# Patient Record
Sex: FEMALE | Race: WHITE
[De-identification: names, ages, dates, MRNs, and addresses within clinical notes are randomized per-mention and may not be internally consistent; named-entity substitution may affect disease eponyms.]

---

## 2021-02-25 PROBLEM — Z00.00 ENCOUNTER FOR PREVENTIVE HEALTH EXAMINATION: Status: ACTIVE | Noted: 2021-02-25

## 2021-02-26 ENCOUNTER — TRANSCRIPTION ENCOUNTER (OUTPATIENT)
Age: 61
End: 2021-02-26

## 2021-02-26 ENCOUNTER — APPOINTMENT (OUTPATIENT)
Dept: NEUROLOGY | Facility: CLINIC | Age: 61
End: 2021-02-26
Payer: COMMERCIAL

## 2021-02-26 PROCEDURE — 99443: CPT | Mod: 95

## 2021-03-01 NOTE — ASSESSMENT
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 Sandhu and may have surgery by the end of the week to remove parathyroid tumors.\par She has been treated symptomatically with Xanax which seem to alleviate the phenomena. Advised to continue.\par \par Please see assessment for summary\par

## 2021-03-01 NOTE — HISTORY OF PRESENT ILLNESS
[Verbal consent obtained from patient] : the patient, [unfilled] [FreeTextEntry1] : Patient initially scheduled for an in-person visit, but could not keep appointment nor convert it to a video-visit because she was scheduled at the same time for her MRI. 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 history of pituitary adenoma removed some 20+ years ago, and over the past few years she has been monitored for parathyroid tumors and hypercalcemia, with gradual worsening of the metabolic pattern and a number of clinical manifestations that have developed recently, including kidney stone formation.\par Over the past couple of months she has developed action tremors in her hands and on occasion in the legs, and she has developed in the past week myoclonic jerks in different body segments, which have been increasing in frequency and distribution.\par \par MRI of the brain, with contrast, reportedly unremarkable.\par

## 2021-03-01 NOTE — PHYSICAL EXAM
[FreeTextEntry1] : Patient has some intermittent voice hesitancies which could be voice tremor, although the pattern is irregular.

## 2021-03-04 ENCOUNTER — LABORATORY RESULT (OUTPATIENT)
Age: 61
End: 2021-03-04

## 2021-03-04 ENCOUNTER — APPOINTMENT (OUTPATIENT)
Dept: NEPHROLOGY | Facility: CLINIC | Age: 61
End: 2021-03-04
Payer: COMMERCIAL

## 2021-03-04 ENCOUNTER — NON-APPOINTMENT (OUTPATIENT)
Age: 61
End: 2021-03-04

## 2021-03-04 VITALS — OXYGEN SATURATION: 96 % | HEART RATE: 55 BPM

## 2021-03-04 VITALS — SYSTOLIC BLOOD PRESSURE: 115 MMHG | DIASTOLIC BLOOD PRESSURE: 84 MMHG | HEART RATE: 93 BPM

## 2021-03-04 VITALS — TEMPERATURE: 98.1 F | WEIGHT: 140 LBS | OXYGEN SATURATION: 97 % | HEART RATE: 96 BPM

## 2021-03-04 VITALS — TEMPERATURE: 98.3 F

## 2021-03-04 DIAGNOSIS — R92.8 OTHER ABNORMAL AND INCONCLUSIVE FINDINGS ON DIAGNOSTIC IMAGING OF BREAST: ICD-10-CM

## 2021-03-04 DIAGNOSIS — E83.52 HYPERCALCEMIA: ICD-10-CM

## 2021-03-04 DIAGNOSIS — N64.89 OTHER SPECIFIED DISORDERS OF BREAST: ICD-10-CM

## 2021-03-04 DIAGNOSIS — Z86.69 PERSONAL HISTORY OF OTHER DISEASES OF THE NERVOUS SYSTEM AND SENSE ORGANS: ICD-10-CM

## 2021-03-04 DIAGNOSIS — F32.9 MAJOR DEPRESSIVE DISORDER, SINGLE EPISODE, UNSPECIFIED: ICD-10-CM

## 2021-03-04 DIAGNOSIS — E78.5 HYPERLIPIDEMIA, UNSPECIFIED: ICD-10-CM

## 2021-03-04 DIAGNOSIS — N80.9 ENDOMETRIOSIS, UNSPECIFIED: ICD-10-CM

## 2021-03-04 DIAGNOSIS — M81.0 AGE-RELATED OSTEOPOROSIS W/OUT CURRENT PATHOLOGICAL FRACTURE: ICD-10-CM

## 2021-03-04 DIAGNOSIS — R25.1 TREMOR, UNSPECIFIED: ICD-10-CM

## 2021-03-04 DIAGNOSIS — E23.0 HYPOPITUITARISM: ICD-10-CM

## 2021-03-04 PROCEDURE — 99072 ADDL SUPL MATRL&STAF TM PHE: CPT

## 2021-03-04 PROCEDURE — 93000 ELECTROCARDIOGRAM COMPLETE: CPT

## 2021-03-04 PROCEDURE — 36415 COLL VENOUS BLD VENIPUNCTURE: CPT

## 2021-03-04 PROCEDURE — 99205 OFFICE O/P NEW HI 60 MIN: CPT | Mod: 25

## 2021-03-04 NOTE — ADDENDUM
[FreeTextEntry1] :  Documented by Brendan Mejias acting as a scribe for Dr. Jovan Ernst on 03/04/2021.

## 2021-03-04 NOTE — HISTORY OF PRESENT ILLNESS
[FreeTextEntry1] : The patient is a 60 year old female presenting for initial evaluation of hypercalcemia, hyperparathyroidism, and constipation. \par \par Chief Complaint:\par - In January she started to have b/l hand tremors and then noticed it in her feet. Reports that one week ago she awoke with a tremor that was more severe with a somewhat large amplitude, multifocal, b/l, usually one muscle at a time with one movement. She had brain MRI with and without contrast which was negative at [facility]. She went to urgent care center and had labs drawn. \par - C/o nine months of constipation which is new for her. No recent colonoscopy, though had negative colonoscopy 8 years ago.\par - Labs from 2/26/21 reveal: RBC 4.71, HGB 14.1, HCT 45.1, PLT 318k, creatinine 0.95, eGFR 65, Ca 11.2\par - Most recent Calcium of 10.3 noted by patient, no physical report available at this time.\par 2017 PTH 64, Ca 10.9.\par - Reports an area of discoloration on right lower breast that had been there for multiple weeks without weeks without a change in color; dermatologist reported it was not dermatologic. She has been evaluated by dermatology and reportedly found nothing of significance. \par - C/o right shoulder pain for the past two weeks.\par \par Past Medical History:\par - PMH familial HLD, depression (followed by Dr. Damaso Aquino), prediabetes (A1C 6.0), hypothyroidism, hypercalcemia, hyperparathyroidism, kidney stones on recent imaging, lumpectomy at age 22, h/o multiple IVFs and found a chocolate cyst and had a laparoscopic cystectomy, diagnosed with stage 3 endometriosis after which she became pregnant.\par - Patient noted in 2012 to have hypercalcemia due to hyperparathyroidism. She was followed by  endocrinology with consideration for surgery but surgery the only clinical manifestation was osteoporosis. She then was The issue was a loss to follow up due to the illness of her PCP. She now has recurrent symptoms noted below associated with higher calciums and high parathyroid, and with CT evidence by report of two parathyroid adenomas (reports awaited).  Followed until 2017 but discontinued due to provider illness. 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 She was initially hyperpituitarism, but has regained pituitary sufficiency of all systems exxcept her thyroid which is now supplemented with levothyroxine. \par - Patient reports that she has received both doses of COVID-19 vaccine.\par - Reports GI evaluation with colonoscopy at age 52.\par - Denies any cardiac problems, no chest pain, palpitations, and SOB.\par - History of depression treated with medication.\par \par Patient sees the following providers: Dr. Tarun Ponce (primary) (addr: [address] [place] [address] [address] [id number]), Dr. Irwin (neuro), \par \par Past Surgical History/ Hospitalizations:\par - 2015 laminotomy\par - 1996 removal of prolactinoma\par - 1982 lumpectomy\par - 1982 vulva biopsy\par - laparoscopic cystectomy \par \par Family History:\par - Noted family history of HLD, diabetes, and depression. 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 She lives with her  and son in [place] but they have been staying in [place] in [facility] to avoid COVID-19.\par \par Current Medications: Synthroid 50mcg QD, metformin 1000mg BID (8 years), Lipitor 40mg QD, Wellbutrin 450mg QD, Effexor 300mg QD, Buspirone 30mg BID.\par \par Allergies: NKDA

## 2021-03-04 NOTE — END OF VISIT
[Time Spent: ___ minutes] : I have spent [unfilled] minutes of time on the encounter. [FreeTextEntry3] : All medical record entries made by the Scribe were at my, Dr. Jovan Ernst, direction and personally dictated by me on 03/04/2021. I have reviewed the chart and agree that the record accurately reflects my personal performance of the history, physical exam, assessment and plan. I have also personally directed, reviewed, and agreed with the chart.

## 2021-03-04 NOTE — ASSESSMENT
[FreeTextEntry1] : Patient is a 60 year year old female presenting today for initial evaluation of hypercalcemia, hyperparathyroidism, and constipation with a PMH of HLD, hypothyroidism, osteoporosis, prediabetes, depression, kidney stone, osteoporosis, remote history of lumpectomy (1982), laparoscopic cystectomy of chocolate cyst, prolactinoma removal (1996). Patient's blood pressure in office is acceptable at 115/84. EKG taken in office today shows T wave inversion in V2. She reports that she has received both doses of the COVID-19 vaccine.\par \par Have recommended the following:\par - Nuclear stress test and echocardiogram due to abnormal EKG which she must have prior to her surgery. Requested patient to have past EKGs sent to our office for review and records.\par - Will refer patient to a breast surgeon for evaluation due to h/o lumpectomy and breast cancer. \par - Multiple labs to complete, to include hypercalcemia workup.\par -  Will review with GI the timing of further workup for constipation.\par - Evaluation with Dr. Caruso for hyperparathyroidism and surgical consult. \par - Will continue neuro follow up with Dr. Irwin.\par \par Change to medications: no change today\par \par EKG taken, results above. Labs were drawn today and patient will return for follow-up evaluation in approximately two weeks after the above tests and evaluations are completed.

## 2021-03-05 ENCOUNTER — APPOINTMENT (OUTPATIENT)
Dept: OTOLARYNGOLOGY | Facility: CLINIC | Age: 61
End: 2021-03-05

## 2021-03-05 LAB
24R-OH-CALCIDIOL SERPL-MCNC: 52.8 PG/ML
25(OH)D3 SERPL-MCNC: 47.3 NG/ML
ACTH SER-ACNC: 31.5 PG/ML
ALBUMIN SERPL ELPH-MCNC: 4.6 G/DL
ALDOSTERONE SERUM: 15.8 NG/DL
ALP BLD-CCNC: 110 U/L
ALT SERPL-CCNC: 39 U/L
ANION GAP SERPL CALC-SCNC: 17 MMOL/L
APPEARANCE: ABNORMAL
APTT BLD: 33.1 SEC
AST SERPL-CCNC: 22 U/L
BACTERIA: NEGATIVE
BASOPHILS # BLD AUTO: 0.04 K/UL
BASOPHILS NFR BLD AUTO: 0.6 %
BILIRUB SERPL-MCNC: 0.2 MG/DL
BILIRUBIN URINE: NEGATIVE
BLOOD URINE: NEGATIVE
BUN SERPL-MCNC: 16 MG/DL
C3 SERPL-MCNC: 120 MG/DL
C4 SERPL-MCNC: 27 MG/DL
CA-I SERPL-SCNC: 1.44 MMOL/L
CALCIUM OXALATE CRYSTALS: ABNORMAL
CALCIUM SERPL-MCNC: 11 MG/DL
CALCIUM SERPL-MCNC: 11 MG/DL
CHLORIDE SERPL-SCNC: 102 MMOL/L
CHOLEST SERPL-MCNC: 176 MG/DL
CO2 SERPL-SCNC: 24 MMOL/L
COLOR: YELLOW
CORTIS SERPL-MCNC: 16.2 UG/DL
CREAT SERPL-MCNC: 0.8 MG/DL
CRP SERPL-MCNC: <3 MG/L
CYSTATIN C SERPL-MCNC: 0.96 MG/L
DEPRECATED KAPPA LC FREE/LAMBDA SER: 1.12 RATIO
EOSINOPHIL # BLD AUTO: 0.23 K/UL
EOSINOPHIL NFR BLD AUTO: 3.5 %
ERYTHROCYTE [SEDIMENTATION RATE] IN BLOOD BY WESTERGREN METHOD: 8 MM/HR
ESTIMATED AVERAGE GLUCOSE: 123 MG/DL
FERRITIN SERPL-MCNC: 63 NG/ML
FOLATE SERPL-MCNC: 10.5 NG/ML
FSH SERPL-MCNC: 47.5 IU/L
GFR/BSA.PRED SERPLBLD CYS-BASED-ARV: 77 ML/MIN
GGT SERPL-CCNC: 17 U/L
GLUCOSE QUALITATIVE U: NEGATIVE
GLUCOSE SERPL-MCNC: 77 MG/DL
HBA1C MFR BLD HPLC: 5.9 %
HBV SURFACE AB SER QL: NONREACTIVE
HBV SURFACE AG SER QL: NONREACTIVE
HCT VFR BLD CALC: 43.3 %
HCV AB SER QL: NONREACTIVE
HCV S/CO RATIO: 0.07 S/CO
HCYS SERPL-MCNC: 16.4 UMOL/L
HDLC SERPL-MCNC: 61 MG/DL
HGB BLD-MCNC: 13.6 G/DL
HYALINE CASTS: 2 /LPF
IMM GRANULOCYTES NFR BLD AUTO: 0.3 %
INR PPP: 0.86 RATIO
IRON SATN MFR SERPL: 15 %
IRON SERPL-MCNC: 53 UG/DL
KAPPA LC CSF-MCNC: 1.36 MG/DL
KAPPA LC SERPL-MCNC: 1.52 MG/DL
KETONES URINE: NEGATIVE
LDLC SERPL CALC-MCNC: 79 MG/DL
LEUKOCYTE ESTERASE URINE: NEGATIVE
LH SERPL-ACNC: 22.3 IU/L
LYMPHOCYTES # BLD AUTO: 0.73 K/UL
LYMPHOCYTES NFR BLD AUTO: 11.2 %
MAGNESIUM SERPL-MCNC: 2.1 MG/DL
MAN DIFF?: NORMAL
MCHC RBC-ENTMCNC: 30.4 PG
MCHC RBC-ENTMCNC: 31.4 GM/DL
MCV RBC AUTO: 96.9 FL
MICROSCOPIC-UA: NORMAL
MONOCYTES # BLD AUTO: 0.39 K/UL
MONOCYTES NFR BLD AUTO: 6 %
NEUTROPHILS # BLD AUTO: 5.13 K/UL
NEUTROPHILS NFR BLD AUTO: 78.4 %
NITRITE URINE: NEGATIVE
NONHDLC SERPL-MCNC: 114 MG/DL
PARATHYROID HORMONE INTACT: 54 PG/ML
PH URINE: 6.5
PHOSPHATE SERPL-MCNC: 3 MG/DL
PLATELET # BLD AUTO: 260 K/UL
POTASSIUM SERPL-SCNC: 4.3 MMOL/L
PROLACTIN SERPL-MCNC: 19.4 NG/ML
PROT SERPL-MCNC: 6.9 G/DL
PROTEIN URINE: NORMAL
PT BLD: 10.2 SEC
RBC # BLD: 4.47 M/UL
RBC # FLD: 12.4 %
RED BLOOD CELLS URINE: 2 /HPF
RENIN PLASMA: 11.5 PG/ML
RHEUMATOID FACT SER QL: <10 IU/ML
SARS-COV-2 IGG SERPL IA-ACNC: 0.06 INDEX
SARS-COV-2 IGG SERPL QL IA: NEGATIVE
SODIUM SERPL-SCNC: 143 MMOL/L
SPECIFIC GRAVITY URINE: 1.02
SQUAMOUS EPITHELIAL CELLS: 5 /HPF
T3FREE SERPL-MCNC: 2.09 PG/ML
T3RU NFR SERPL: 0.9 TBI
T4 FREE SERPL-MCNC: 1.5 NG/DL
T4 SERPL-MCNC: 8.8 UG/DL
TIBC SERPL-MCNC: 343 UG/DL
TRIGL SERPL-MCNC: 175 MG/DL
TSH SERPL-ACNC: 0.68 UIU/ML
UIBC SERPL-MCNC: 290 UG/DL
URATE SERPL-MCNC: 4.6 MG/DL
UROBILINOGEN URINE: NORMAL
VIT B12 SERPL-MCNC: 383 PG/ML
WBC # FLD AUTO: 6.54 K/UL
WHITE BLOOD CELLS URINE: 1 /HPF

## 2021-03-09 ENCOUNTER — APPOINTMENT (OUTPATIENT)
Dept: HEART AND VASCULAR | Facility: CLINIC | Age: 61
End: 2021-03-09

## 2021-03-11 ENCOUNTER — APPOINTMENT (OUTPATIENT)
Dept: NEPHROLOGY | Facility: CLINIC | Age: 61
End: 2021-03-11

## 2021-03-12 LAB
ALBUMIN MFR SERPL ELPH: 61.6 %
ALBUMIN SERPL-MCNC: 4.3 G/DL
ALBUMIN/GLOB SERPL: 1.7 RATIO
ALP BONE SERPL-MCNC: 22.7 UG/L
ALPHA1 GLOB MFR SERPL ELPH: 3.9 %
ALPHA1 GLOB SERPL ELPH-MCNC: 0.3 G/DL
ALPHA2 GLOB MFR SERPL ELPH: 10.5 %
ALPHA2 GLOB SERPL ELPH-MCNC: 0.7 G/DL
ANA SER IF-ACNC: NEGATIVE
B-GLOBULIN MFR SERPL ELPH: 12.1 %
B-GLOBULIN SERPL ELPH-MCNC: 0.8 G/DL
C1Q IMMUNE COMPLEX: 2.6 UG EQ/ML
C3D IMMUNE COMPLEXES: 37 UG EQ/ML
COLLAGEN CTX SERPL-MCNC: 380 PG/ML
DEPRECATED KAPPA LC FREE/LAMBDA SER: 1.12 RATIO
GAMMA GLOB FLD ELPH-MCNC: 0.8 G/DL
GAMMA GLOB MFR SERPL ELPH: 11.9 %
IGA SER QL IEP: 184 MG/DL
IGG SER QL IEP: 783 MG/DL
IGG4 SER-MCNC: 1 MG/DL
IGM SER QL IEP: 141 MG/DL
INTERPRETATION SERPL IEP-IMP: NORMAL
KAPPA LC CSF-MCNC: 1.36 MG/DL
KAPPA LC SERPL-MCNC: 1.52 MG/DL
M PROTEIN SPEC IFE-MCNC: NORMAL
MPO AB + PR3 PNL SER: NORMAL
PROT SERPL-MCNC: 6.9 G/DL
PROT SERPL-MCNC: 6.9 G/DL
THYROGLOB AB SERPL-ACNC: <20 IU/ML
THYROPEROXIDASE AB SERPL IA-ACNC: <10 IU/ML

## 2021-03-23 LAB — METHYLMALONATE SERPL-SCNC: 192 NMOL/L

## 2023-04-05 ENCOUNTER — NON-APPOINTMENT (OUTPATIENT)
Age: 63
End: 2023-04-05